# Patient Record
Sex: MALE | Race: WHITE
[De-identification: names, ages, dates, MRNs, and addresses within clinical notes are randomized per-mention and may not be internally consistent; named-entity substitution may affect disease eponyms.]

---

## 2020-01-21 ENCOUNTER — HOSPITAL ENCOUNTER (OUTPATIENT)
Dept: HOSPITAL 59 - SUR | Age: 50
LOS: 1 days | Discharge: HOME HEALTH | End: 2020-01-22
Attending: ORTHOPAEDIC SURGERY
Payer: COMMERCIAL

## 2020-01-21 DIAGNOSIS — E66.9: ICD-10-CM

## 2020-01-21 DIAGNOSIS — I10: ICD-10-CM

## 2020-01-21 DIAGNOSIS — G47.33: ICD-10-CM

## 2020-01-21 DIAGNOSIS — M17.12: Primary | ICD-10-CM

## 2020-01-21 DIAGNOSIS — R05: ICD-10-CM

## 2020-01-21 LAB
ABO GROUP: (no result)
ANTIBODY SCREEN: NEGATIVE
RH TYPE: POSITIVE

## 2020-01-21 PROCEDURE — 86900 BLOOD TYPING SEROLOGIC ABO: CPT

## 2020-01-21 PROCEDURE — 85014 HEMATOCRIT: CPT

## 2020-01-21 PROCEDURE — 86850 RBC ANTIBODY SCREEN: CPT

## 2020-01-21 PROCEDURE — 86901 BLOOD TYPING SEROLOGIC RH(D): CPT

## 2020-01-21 PROCEDURE — 76942 ECHO GUIDE FOR BIOPSY: CPT

## 2020-01-21 PROCEDURE — 80048 BASIC METABOLIC PNL TOTAL CA: CPT

## 2020-01-21 PROCEDURE — 85018 HEMOGLOBIN: CPT

## 2020-01-21 RX ADMIN — DEXTROSE AND SODIUM CHLORIDE SCH MLS/HR: 5; .9 INJECTION, SOLUTION INTRAVENOUS at 14:07

## 2020-01-21 RX ADMIN — DEXTROSE AND SODIUM CHLORIDE SCH MLS/HR: 5; .9 INJECTION, SOLUTION INTRAVENOUS at 22:02

## 2020-01-21 RX ADMIN — HYDROCODONE BITARTRATE AND ACETAMINOPHEN PRN EACH: 325; 10 TABLET ORAL at 12:53

## 2020-01-21 RX ADMIN — HYDROCODONE BITARTRATE AND ACETAMINOPHEN PRN EACH: 325; 10 TABLET ORAL at 17:56

## 2020-01-21 RX ADMIN — HYDROCODONE BITARTRATE AND ACETAMINOPHEN PRN EACH: 325; 10 TABLET ORAL at 21:57

## 2020-01-21 RX ADMIN — DOCUSATE SODIUM SCH MG: 100 TABLET, FILM COATED ORAL at 21:55

## 2020-01-21 RX ADMIN — CEFAZOLIN SODIUM SCH MLS/HR: 2 SOLUTION INTRAVENOUS at 16:20

## 2020-01-21 NOTE — OPERATIVE NOTE
DATE OF SURGERY: 01/21/2020 



PREOPERATIVE DIAGNOSIS: End-stage arthrosis of the left knee. 



POSTOPERATIVE DIAGNOSIS: End-stage arthrosis of the left knee. 



OPERATION: Cemented left total knee arthroplasty using Smith [employer] Nephew 
components with a size 7 Legion Oxinium femoral component, a size 6 stemmed 
tibia baseplate, a 9 mm lipped highly crosslinked tibial insert, and a 35 mm 
all-plastic patella. 



STAFF SURGEON: Wero Kraft MD 



ANESTHESIA: Spinal. 



PREPARATION: Chloraprep. 



INDIVIDUAL CONSIDERATIONS: None. 



PROCEDURE: The patient was taken to the operating room, placed supine on the 
operating room table. He had a successful induction of a spinal anesthetic. The 
left lower extremity was prepped and draped in the usual fashion. 



The limb was elevated and tourniquet was inflated to 250 mmHg. Sharp dissection 
carried down through skin and subcutaneous tissue. Small veins were coagulated 
with a Bovie. A medial arthrotomy was performed. The patella was everted and the
knee was flexed. The patient had exposed bone with bone loss in the medial and 
some in the patellofemoral compartments. Fat pad was resected, ACL was 
sacrificed, and provisional anterior meniscectomies were performed. The capsule 
was released from the medial proximal tibia. The initial femoral  hole was 
then made freehand. The intramedullary femoral cutting jig was placed. It was 
set in 7.0 degrees of valgus and adjusted for rotation and secured with pins for
a 10 mm resection. The initial transverse cut was then made. The skin guide was 
placed in 3 degrees of external rotation and the  holes were drilled. The 
anterior and posterior cuts followed by chamfer cuts were made for a size 7, 
which fit appropriately. Large medial osteophytes were removed, and a size 7 
trial was placed and found to fit well. 



The tibia was brought forward, and the remainder of the meniscal remnants 
removed with a Bovie. The extraarticular tibial cutting jig was placed. It was 
cut in neutral with a 3-degree AP slope. It was set for a 9 mm resection keyed 
off the high lateral side and secured with pins. When cutting the tibia, care 
was taken to preserve the PCL insertion on the tibia. After removing large 
medial osteophytes, I could fit a size 6. It was adjusted for rotation and 
secured with pins. With a 9 mm trial and femoral trial, there was excellent 
motion and stability. Ligamentous balance and rotation alignment were thought to
be normal. Femoral  holes were impacted and tri-flange tibial stamp was 
impacted, and these trial components were removed. 



The patient had a very thick patella and roughly 9 mm of bone was removed and 
the 3  holes were drilled for the 35 mm patella. The tourniquet was let 
down briefly to get bleeders posteriorly and then placed back up again. The knee
was then thoroughly irrigated out with pulsatile Betadine and saline to remove 
any visual or palpable debris. Bony surfaces were dried with a CarboJet prior to
cementing. A size 6 stemmed tibia baseplate was cemented into place followed by 
impaction of the 9 mm lipped highly crosslinked tibial insert followed by 
cementing in the size 7 Oxinium femur followed by cementing in the 35 mm 
patella. The implant surfaces were compressed, excess cement was removed. After 
the cement had set, there was excellent motion and stability. Ligamentous 
balance, rotation alignment, and patellofemoral tracking were normal. No lateral
release was required. Tourniquet was let down. Hemostasis was obtained with a 
Bovie. Final irrigation with pulsatile Betadine and saline. The skin, 
periosteum, and subcu were infiltrated with 30 mL of 0.5% Marcaine with 
epinephrine. The capsule was then closed with a running #2 quill, subcu was 
closed in layers with running 0 quill, skin was closed with staples. Then 1 g of
tranexamic acid was mixed with 30 mL of saline and injected into the knee 
through a sterile 18-gauge needle, and a sterile bulky compressive ISSAC-type 
dressing was applied. The patient tolerated the procedure well. Needle and 
sponge counts were correct. Estimated blood loss was minimal, and he was taken 
back to recovery in good condition. There were no complications. 

[facility]

## 2020-01-21 NOTE — REHAB EVALUATION
Patient Information





- Patient Information


Diagnosis: L knee DJD


Ordered Treatment: PT Evaluate and Treat


Status: Initial Evaluation


Surgery: Yes (L TKA)


Date of Surgery: 01/21/20


Past Medical/Surgical Hx: 


                          PAST MEDICAL/SURGICAL HISTORY





Surgery to Affected Area?        No


Recent Surgery?                  


Past Surgical History            ACL RECONSTRUCTION RIGHT KNEE


                                 BILAT KNEE SCOPES


                                 





PMH - Respiratory





Hx Respiratory Disorders         No





PMH - Cardiovascular





Hx Cardiovascular Disorders      Yes


Hx Hypertension                  Yes: CONTROLLED WITH MEDS


Exercise Tolerance               Good





PMH - Neuro





Hx Neurological Disorders        No





PMH - GI





Hx Gastrointestinal Disorders    Yes


Hx Gastroesophageal Reflux       Yes: ON OCCASSION





PMH - 





Hx Genitourinary Disorders       No





PMH - Endocrine





Hx Endocrine Disorders           No





PMH - Musculoskeletal





Hx Musculoskeletal Disorders     Yes


Hx Arthritis                     Yes: KNEES





PMH - Psych





Hx Psychiatric Problems          No





PMH - Hematology/Oncology





Hx Hematology/Oncology           Yes


Disorders                        


Hx Cancer                        Yes: SKIN CA shoulder & back of head 6/6/18








Premorbid Status: Detail (The patient was independent with all mobility prior to

history.)


Social History: Detail (The patient lives with spouse in a one story house with 

a step, landing and a step with no railings at the front enterance. The bathroom

is equipped with: a walk in shower with a small "lip" to step over, hand held 

shower head, built in shower bench and an elevated toilet. There are no grab 

bars in the bathroom. The patient has a front wheeled walker and single point 

cane.)


Precautions: Universal, Fall, Other (WBAT on the L LE.)





- Time With Patient


Total Time Spent With Patient (Min): 30


Treatment Procedures: Detail (Initial Evaluation, low complexity, gait training)





Subjective Information





- Subjective Information


Per Patient (The patient complained of L knee aching but not pain.)





Objective Data





- Mental Status


Patient Orientation: Oriented x3





- Visual Perception


Appears within normal limits for therapeutic activities





- ROM


Not within normal limits (The patient's L knee AROM is limited s/p surgery. All 

other LE AROM is WNL.)





- Strength/Tone


Not within normal limits (The patient's LE strength was not tested s/p however 

is WFL.)





- Bed Mobility


Independent (The patient was independent with sit to supine and required minimal

PA to move L LE with supine to sit. The patient was independent with scooting up

in bed.)





- Transfers


Independent (The patient was independent with sit to and from stand transfer.)





- Balance


Balance Sitting: Good


Balance Standing: Good





- Sensation


Intact





- Gait


Detail (The patient ambulated with front wheeled walker WBAT on the LLE a 

distance of 60 feet x 1 with assist of one to handle equipment only.)





Therapy Assessment





- Therapy Assessment


Detail (The patient was independent with transfers and ambulation and required 

minimal assist with moving L LE with supine to sit. The patient will be seen for

 1-2 PT visits for completion of PT inpt. goals.)





Problem List





- Problem List


Physical Therapy Problem List: Detail ( Decreased L knee AROM and L LE strength)





Goals





- Goals


Physical Therapy Goals: 1) The patient will be independent with bed mobility.  

2) The patient will be independent with TKA HEP.  3) The patient will ambulate 

on stairs using proper technique with supervision for safety only.  4) The 

patient will ambulate independently WBAT on the L LE WBAT on the L LE.





Prognosis





- Prognosis


Good





Plan





- Plan


Physical Therapy Plan: PT 1-2 sessions for gait training on levels and stairs, 

bed mobility and instruction in TKA HEP.

## 2020-01-22 LAB
ANION GAP SERPL CALC-SCNC: 14 MMOL/L (ref 7–16)
BUN SERPL-MCNC: 9 MG/DL (ref 6–20)
CO2 SERPL-SCNC: 24 MMOL/L (ref 22–29)
CREAT SERPL-MCNC: 0.8 MG/DL (ref 0.7–1.2)
EST GLOMERULAR FILTRATION RATE: > 60 ML/MIN
GLUCOSE SERPL-MCNC: 113 MG/DL (ref 74–109)
HCT VFR BLD CALC: 42.2 % (ref 42–52)
HGB BLD-MCNC: 13.5 GM/DL (ref 14–18)

## 2020-01-22 RX ADMIN — HYDROCODONE BITARTRATE AND ACETAMINOPHEN PRN EACH: 325; 10 TABLET ORAL at 06:17

## 2020-01-22 RX ADMIN — CEFAZOLIN SODIUM SCH MLS/HR: 2 SOLUTION INTRAVENOUS at 09:48

## 2020-01-22 RX ADMIN — HYDROCODONE BITARTRATE AND ACETAMINOPHEN PRN EACH: 325; 10 TABLET ORAL at 01:45

## 2020-01-22 RX ADMIN — DEXTROSE AND SODIUM CHLORIDE SCH: 5; .9 INJECTION, SOLUTION INTRAVENOUS at 12:06

## 2020-01-22 RX ADMIN — HYDROCODONE BITARTRATE AND ACETAMINOPHEN PRN EACH: 325; 10 TABLET ORAL at 10:04

## 2020-01-22 RX ADMIN — CEFAZOLIN SODIUM SCH MLS/HR: 2 SOLUTION INTRAVENOUS at 00:01

## 2020-01-22 RX ADMIN — DOCUSATE SODIUM SCH MG: 100 TABLET, FILM COATED ORAL at 09:50

## 2020-01-22 RX ADMIN — DEXTROSE AND SODIUM CHLORIDE SCH: 5; .9 INJECTION, SOLUTION INTRAVENOUS at 05:36

## 2020-01-22 RX ADMIN — HYDROCODONE BITARTRATE AND ACETAMINOPHEN PRN EACH: 325; 10 TABLET ORAL at 15:19

## 2020-01-22 NOTE — REHAB EVALUATION
Patient Information





- Patient Information


Diagnosis: L knee DJD


Ordered Treatment: OT Evaluate and Treat


Status: Initial Evaluation


Surgery: Yes (L TKA)


Date of Surgery: 01/21/20


Past Medical/Surgical Hx: 


                          PAST MEDICAL/SURGICAL HISTORY





Surgery to Affected Area?        No


Recent Surgery?                  


Past Surgical History            ACL RECONSTRUCTION RIGHT KNEE


                                 BILAT KNEE SCOPES


                                 





PMH - Respiratory





Hx Respiratory Disorders         No





PMH - Cardiovascular





Hx Cardiovascular Disorders      Yes


Hx Hypertension                  Yes: CONTROLLED WITH MEDS


Exercise Tolerance               Good





PMH - Neuro





Hx Neurological Disorders        No





PMH - GI





Hx Gastrointestinal Disorders    Yes


Hx Gastroesophageal Reflux       Yes: ON OCCASSION





PMH - 





Hx Genitourinary Disorders       No





PMH - Endocrine





Hx Endocrine Disorders           No





PMH - Musculoskeletal





Hx Musculoskeletal Disorders     Yes


Hx Arthritis                     Yes: KNEES





PMH - Psych





Hx Psychiatric Problems          No





PMH - Hematology/Oncology





Hx Hematology/Oncology           Yes


Disorders                        


Hx Cancer                        Yes: SKIN CA shoulder & back of head 6/6/18








Premorbid Status: Detail (The patient was independent with all mobility prior to

history.  He is typically responsible for meal prep and yard work and his spouse

is responsible for home mgmt and laundry tasks.)


Social History: Detail (The patient lives with spouse in a one story house with 

a step, landing and a step with no railings at the front entrance. The bathroom 

is equipped with: a walk in shower with a small "lip" to step over, hand held 

shower head, built in shower bench and an elevated toilet. There are no grab 

bars in the bathroom. The patient has a front wheeled walker and single point 

cane.)


Precautions: Universal, Fall, Other (WBAT on the L LE.)





- Time With Patient


Total Time Spent With Patient (Min): 40


Treatment Procedures: Detail (OT eval low complexity)





Subjective Information





- Subjective Information


Per Patient, Other (Spouse)





Objective Data





- Pain


Pain Present: Yes (4/10)





- Mental Status


Patient Orientation: Oriented x3





- Visual Perception


Appears within normal limits for therapeutic activities





- ROM


Within normal limits ([place] UE AROM WNL)





- Strength/Tone


Within normal limits ([place] UE strength WNL)





- Coordination


Appears within normal limits for therapeutic activities





- Bed Mobility


Independent (Ind with supine to sit and sit to supine)





- Transfers


Independent (Ind with sit to stand from EOB)





- Balance


Balance Sitting: Good


Balance Standing: Good





- Sensation


Intact





- Gait


Detail (Pt ambulating in room with 2 wheeled walker and supervision)





- ADL's/IADL's


Detail (Pt was partially dressed upon arrival to room.  Reviewed modified LE 

dressing techniques and pt was able to demonstrate donning mariana sock (with 

assist) and donning socks and tennis shoes with min assist from spouse for heel 

of shoe.  Pt able to verbalize modified LE dressing technique and spouse will be

able to assist if needed.  Reviewed kitchen and shower safety and modifications,

pt verbalized understanding.)





Therapy Assessment





- Therapy Assessment


Detail (Pt verbalized understanding of modified LE dressing techniques.)





Problem List





- Problem List


Physical Therapy Problem List: Detail ( Decreased L knee AROM and L LE strength)


Occupational Therapy Problem List: Detail (No current OT problems identified.)





Goals





- Goals


Physical Therapy Goals: 1) The patient will be independent with bed mobility.  

2) The patient will be independent with TKA HEP.  3) The patient will ambulate 

on stairs using proper technique with supervision for safety only.  4) The 

patient will ambulate independently WBAT on the L LE WBAT on the L LE.


Occupational Therapy Goals: No current IP OT goals identified.





Prognosis





- Prognosis


Good





Plan





- Plan


Physical Therapy Plan: PT 1-2 sessions for gait training on levels and stairs, 

bed mobility and instruction in TKA HEP.


Occupational Therapy Plan: Pt is discharged from IP OT at this time.  Thank you 

for this referral.

## 2020-01-22 NOTE — PHYSICAL THERAPY TX NOTE
Physical Therapy Tx Note





- Treatment Note


Tolerated: Good


Total Time Spent With Patient: 40


Physical Therapy Tx Note: Detail (Patient was reclined in bed upon PTA arrival. 

Patient transferred supine to sit independently.  Patient donned shoes 

independently.  Patient transferred sit to and from stand SBA x1.  Patient 

ambulated 118 feet with wheeled walker SBA x1.  Patient descended and ascended 3

steps with using stairwell railing and walker CGA x1.  Patient transferred sit 

to supine independently.  Patient performed the following exercises x5-10 reps 

each: ankle pumps, glut squeezes, quad sets, heel slides, SLR, and hamstring 

curls.  Patient tolerated treatment well.  Patient displays good understanding 

of ambulation, stair climbing, and HEP.  Patient was left supine in bed with 

call light within reach.  Patient discharged from inpatient PT at this time as a

ll goals are met.)


Physical Therapy Problem List: Detail ( Decreased L knee AROM and L LE strength)


Physical Therapy Goals: 1) The patient will be independent with bed mobility.  

Met.  2) The patient will be independent with TKA HEP. Met.  3) The patient will

ambulate on stairs using proper technique with supervision for safety only. Met.

 4) The patient will ambulate independently WBAT on the L LE WBAT on the L LE. 

Met.


Prognosis: Good


Physical Therapy Plan: Patient discharged from inpatient PT as all goals are 

met.